# Patient Record
Sex: FEMALE | Race: WHITE | NOT HISPANIC OR LATINO | Employment: FULL TIME | URBAN - NONMETROPOLITAN AREA
[De-identification: names, ages, dates, MRNs, and addresses within clinical notes are randomized per-mention and may not be internally consistent; named-entity substitution may affect disease eponyms.]

---

## 2017-01-18 ENCOUNTER — OFFICE VISIT (OUTPATIENT)
Dept: FAMILY MEDICINE CLINIC | Facility: CLINIC | Age: 47
End: 2017-01-18

## 2017-01-18 VITALS
WEIGHT: 236 LBS | BODY MASS INDEX: 34.96 KG/M2 | HEIGHT: 69 IN | OXYGEN SATURATION: 100 % | HEART RATE: 75 BPM | SYSTOLIC BLOOD PRESSURE: 127 MMHG | DIASTOLIC BLOOD PRESSURE: 58 MMHG | TEMPERATURE: 98.2 F

## 2017-01-18 DIAGNOSIS — M54.50 BILATERAL LOW BACK PAIN WITHOUT SCIATICA, UNSPECIFIED CHRONICITY: ICD-10-CM

## 2017-01-18 DIAGNOSIS — E66.9 OBESITY, UNSPECIFIED OBESITY SEVERITY, UNSPECIFIED OBESITY TYPE: ICD-10-CM

## 2017-01-18 DIAGNOSIS — M51.36 DEGENERATION OF INTERVERTEBRAL DISC OF LUMBAR REGION: ICD-10-CM

## 2017-01-18 PROCEDURE — 99213 OFFICE O/P EST LOW 20 MIN: CPT | Performed by: FAMILY MEDICINE

## 2017-01-18 RX ORDER — HYDROCODONE BITARTRATE AND ACETAMINOPHEN 10; 325 MG/1; MG/1
1 TABLET ORAL EVERY 8 HOURS PRN
Qty: 90 TABLET | Refills: 0 | Status: SHIPPED | OUTPATIENT
Start: 2017-01-18 | End: 2017-02-15 | Stop reason: SDUPTHER

## 2017-01-18 RX ORDER — PHENTERMINE HYDROCHLORIDE 37.5 MG/1
37.5 TABLET ORAL
Qty: 30 TABLET | Refills: 0 | Status: SHIPPED | OUTPATIENT
Start: 2017-01-18 | End: 2017-02-15 | Stop reason: SDUPTHER

## 2017-01-18 NOTE — PROGRESS NOTES
Subjective   Jessy Uribe is a 46 y.o. female.     Chief Complaint   Patient presents with   • Follow-up     med refills       History of Present Illness   Pt here for sched f/u appt; denies new injuries; no saddle anesthesia; no bowel/bladder incontinence; no new rashes; no CP/SOA/palp; no syncope or vertigo.  The following portions of the patient's history were reviewed and updated as appropriate: allergies, current medications, past family history, past medical history, past social history, past surgical history and problem list.    Review of Systems   Constitutional: Negative for activity change, appetite change, chills, diaphoresis, fatigue, fever and unexpected weight change.   HENT: Negative for congestion, dental problem, drooling, ear discharge, ear pain, facial swelling, hearing loss, mouth sores, nosebleeds, postnasal drip, rhinorrhea, sinus pressure, sneezing, sore throat, tinnitus, trouble swallowing and voice change.    Eyes: Negative for photophobia, pain, discharge, redness, itching and visual disturbance.   Respiratory: Negative for apnea, cough, choking, chest tightness, shortness of breath, wheezing and stridor.    Cardiovascular: Negative for chest pain, palpitations and leg swelling.   Gastrointestinal: Negative for abdominal distention, abdominal pain, anal bleeding, blood in stool, constipation, diarrhea, nausea, rectal pain and vomiting.   Endocrine: Negative for cold intolerance, heat intolerance, polydipsia, polyphagia and polyuria.   Genitourinary: Negative for decreased urine volume, difficulty urinating, dysuria, enuresis, flank pain, frequency, genital sores, hematuria and urgency.   Musculoskeletal: Positive for arthralgias, back pain and myalgias. Negative for gait problem, joint swelling, neck pain and neck stiffness.   Skin: Negative for color change, pallor, rash and wound.   Allergic/Immunologic: Negative for food allergies and immunocompromised state.   Neurological:  "Positive for numbness. Negative for dizziness, tremors, seizures, syncope, facial asymmetry, speech difficulty, weakness, light-headedness and headaches.   Hematological: Negative for adenopathy. Does not bruise/bleed easily.   Psychiatric/Behavioral: Negative for agitation, behavioral problems, confusion, decreased concentration, dysphoric mood, hallucinations, self-injury, sleep disturbance and suicidal ideas. The patient is not nervous/anxious and is not hyperactive.        Patient Active Problem List   Diagnosis   • Iron deficiency anemia   • Low back pain   • Pain of right lower extremity   • Paresthesia of lower extremity   • Tobacco dependence syndrome   • Degeneration of intervertebral disc of lumbar region   • Obesity   • Hot flashes   • Current smoker       Current Outpatient Prescriptions on File Prior to Visit   Medication Sig Dispense Refill   • [DISCONTINUED] HYDROcodone-acetaminophen (NORCO)  MG per tablet Take 1 tablet by mouth Every 8 (Eight) Hours As Needed for moderate pain (4-6). 90 tablet 0   • [DISCONTINUED] phentermine (ADIPEX-P) 37.5 MG tablet Take 1 tablet by mouth Every Morning Before Breakfast. 30 tablet 0     No current facility-administered medications on file prior to visit.        Social History     Social History   • Marital status: Single     Spouse name: N/A   • Number of children: N/A   • Years of education: N/A     Occupational History   • Not on file.     Social History Main Topics   • Smoking status: Current Every Day Smoker   • Smokeless tobacco: Not on file   • Alcohol use No      Comment: unknown   • Drug use: No   • Sexual activity: Defer     Other Topics Concern   • Not on file     Social History Narrative       Objective   Blood pressure 127/58, pulse 75, temperature 98.2 °F (36.8 °C), height 69\" (175.3 cm), weight 236 lb (107 kg), SpO2 100 %.     Physical Exam Constitutional   General appearance: No acute distress, well appearing and well nourished.    Head and Face "   Head and face: Normal.    Palpation of the face and sinuses: No sinus tenderness.    Eyes   Conjunctiva and lids: No swelling, erythema or discharge.    Ears, Nose, Mouth, and Throat   External inspection of ears and nose: Normal.    Otoscopic examination: Tympanic membranes translucent with normal light reflex. Canals patent without erythema.    Hearing: Normal.    Nasal mucosa, septum, and turbinates: Normal without edema or erythema.    Lips, teeth, and gums: Normal, good dentition.    Oropharynx: Normal with no erythema, edema, exudate or lesions.    Neck   Neck: Supple, symmetric, trachea midline, no masses.    Thyroid: Normal, no thyromegaly.    Pulmonary   Respiratory effort: No increased work of breathing or signs of respiratory distress.    Auscultation of lungs: Abnormal.  Trace referred upper airway congestion.    Cardiovascular   Auscultation of heart: Normal rate and rhythm, normal S1 and S2, no murmurs.    Carotid pulses: 2+ bilaterally.    Examination of extremities for edema and/or varicosities: Abnormal.  Trace, non-pitting edema to rachael LE.    Chest Pt defers.    Chest: Normal.    Abdomen   Abdomen: Non-tender, no masses. Post-surgical scarring noted from recent laparoscopic procedure; CDI.    Liver and spleen: No hepatomegaly or splenomegaly.    Genitourinary Pt defers.    Lymphatic   Palpation of lymph nodes in neck: No lymphadenopathy.    Musculoskeletal   Gait and station: Normal.    Digits and nails: Normal without clubbing or cyanosis.    Joints, bones, and muscles: Abnormal.  Palpable abnml to anterior/medial border of distal tibial ridge; skin texture and visual appearance changed from surrounding tissue.    Range of motion: Abnormal.  Dec ROM A/P to L/S spine; mild tenderness on palpation of rachael SI joints; paraspinal musculature spastic in nature from T10-L5.    Stability: Normal.    Muscle strength/tone: Normal.    Skin   Skin and subcutaneous tissue: Normal without rashes or lesions.     Neurologic   Cranial nerves: Cranial nerves II-XII intact.    Cortical function: Normal mental status.    Reflexes: 2+ and symmetric.    Sensation: Abnormal.  Dec sensation to light touch/pressure to anterior distal RLE, medial border of tibial ridge.    Coordination: Normal finger to nose and heel to shin.    Psychiatric   Judgment and insight: Normal.    Orientation to person, place, and time: Normal.    Recent and remote memory: Intact.   Mood and affect: Normal    No results found for this or any previous visit.    Assessment/Plan   Problems Addressed this Visit        Digestive    Obesity    Relevant Medications    phentermine (ADIPEX-P) 37.5 MG tablet       Nervous and Auditory    Low back pain    Relevant Medications    HYDROcodone-acetaminophen (NORCO)  MG per tablet       Musculoskeletal and Integument    Degeneration of intervertebral disc of lumbar region    Relevant Medications    HYDROcodone-acetaminophen (NORCO)  MG per tablet      Other Visit Diagnoses     BMI 34.0-34.9,adult    -  Primary    Relevant Medications    phentermine (ADIPEX-P) 37.5 MG tablet               Discussion/Summary:  Discussed plan of care in detail with pt today; pt verb understanding and agrees; counseled for approx 10 min of total 15 min exam time.    Discussed need for reduction in sodium/salt/caffeine intake; improve sleep habits as able; inc formal CV exercise program with goal of vigorous activity most, if not all, days of the week; goal of 50 min of sustained HR CV exercise; to avoid prolonged fasting periods.    Maintain good hydration habits; asked that pt again continue to incorporate core strengthening/stabilizing programs such as yoga/pilates.    There are no Patient Instructions on file for this visit.

## 2017-02-15 ENCOUNTER — OFFICE VISIT (OUTPATIENT)
Dept: FAMILY MEDICINE CLINIC | Facility: CLINIC | Age: 47
End: 2017-02-15

## 2017-02-15 VITALS
OXYGEN SATURATION: 99 % | BODY MASS INDEX: 35.25 KG/M2 | WEIGHT: 238 LBS | HEART RATE: 66 BPM | SYSTOLIC BLOOD PRESSURE: 142 MMHG | HEIGHT: 69 IN | DIASTOLIC BLOOD PRESSURE: 59 MMHG | TEMPERATURE: 98.3 F

## 2017-02-15 DIAGNOSIS — E66.9 OBESITY, UNSPECIFIED OBESITY SEVERITY, UNSPECIFIED OBESITY TYPE: ICD-10-CM

## 2017-02-15 DIAGNOSIS — H65.22 CHRONIC SEROUS OTITIS MEDIA OF LEFT EAR: ICD-10-CM

## 2017-02-15 DIAGNOSIS — M54.50 BILATERAL LOW BACK PAIN WITHOUT SCIATICA, UNSPECIFIED CHRONICITY: ICD-10-CM

## 2017-02-15 DIAGNOSIS — M51.36 DEGENERATION OF INTERVERTEBRAL DISC OF LUMBAR REGION: ICD-10-CM

## 2017-02-15 PROCEDURE — 99213 OFFICE O/P EST LOW 20 MIN: CPT | Performed by: FAMILY MEDICINE

## 2017-02-15 RX ORDER — DEXAMETHASONE 0.5 MG/1
TABLET ORAL
Qty: 21 TABLET | Refills: 0 | Status: SHIPPED | OUTPATIENT
Start: 2017-02-15 | End: 2017-03-09

## 2017-02-15 RX ORDER — PHENTERMINE HYDROCHLORIDE 37.5 MG/1
37.5 TABLET ORAL
Qty: 30 TABLET | Refills: 0 | Status: SHIPPED | OUTPATIENT
Start: 2017-02-15 | End: 2017-03-09 | Stop reason: SDUPTHER

## 2017-02-15 RX ORDER — HYDROCODONE BITARTRATE AND ACETAMINOPHEN 10; 325 MG/1; MG/1
1 TABLET ORAL EVERY 8 HOURS PRN
Qty: 90 TABLET | Refills: 0 | Status: SHIPPED | OUTPATIENT
Start: 2017-02-15 | End: 2017-03-09 | Stop reason: SDUPTHER

## 2017-02-15 NOTE — PROGRESS NOTES
Subjective   Jessy Uribe is a 46 y.o. female.     Chief Complaint   Patient presents with   • Follow-up     med refills       History of Present Illness   Pt here for sched f/u appt; denies problems with current med regimen; no F/C; no palp/CP/SOA.  The following portions of the patient's history were reviewed and updated as appropriate: allergies, current medications, past family history, past medical history, past social history, past surgical history and problem list.    Review of Systems   Constitutional: Negative for activity change, appetite change, chills, diaphoresis, fatigue, fever and unexpected weight change.   HENT: Negative for congestion, dental problem, drooling, ear discharge, ear pain, facial swelling, hearing loss, mouth sores, nosebleeds, postnasal drip, rhinorrhea, sinus pressure, sneezing, sore throat, tinnitus, trouble swallowing and voice change.    Eyes: Negative for photophobia, pain, discharge, redness, itching and visual disturbance.   Respiratory: Negative for apnea, cough, choking, chest tightness, shortness of breath, wheezing and stridor.    Cardiovascular: Negative for chest pain, palpitations and leg swelling.   Gastrointestinal: Negative for abdominal distention, abdominal pain, anal bleeding, blood in stool, constipation, diarrhea, nausea, rectal pain and vomiting.   Endocrine: Negative for cold intolerance, heat intolerance, polydipsia, polyphagia and polyuria.   Genitourinary: Negative for decreased urine volume, difficulty urinating, dysuria, enuresis, flank pain, frequency, genital sores, hematuria and urgency.   Musculoskeletal: Positive for arthralgias, back pain and myalgias. Negative for gait problem, joint swelling, neck pain and neck stiffness.   Skin: Negative for color change, pallor, rash and wound.   Allergic/Immunologic: Negative for food allergies and immunocompromised state.   Neurological: Positive for numbness. Negative for dizziness, tremors, seizures,  "syncope, facial asymmetry, speech difficulty, weakness, light-headedness and headaches.   Hematological: Negative for adenopathy. Does not bruise/bleed easily.   Psychiatric/Behavioral: Negative for agitation, behavioral problems, confusion, decreased concentration, dysphoric mood, hallucinations, self-injury, sleep disturbance and suicidal ideas. The patient is not nervous/anxious and is not hyperactive.        Patient Active Problem List   Diagnosis   • Iron deficiency anemia   • Low back pain   • Pain of right lower extremity   • Paresthesia of lower extremity   • Tobacco dependence syndrome   • Degeneration of intervertebral disc of lumbar region   • Obesity   • Hot flashes   • Current smoker       Current Outpatient Prescriptions on File Prior to Visit   Medication Sig Dispense Refill   • [DISCONTINUED] HYDROcodone-acetaminophen (NORCO)  MG per tablet Take 1 tablet by mouth Every 8 (Eight) Hours As Needed for moderate pain (4-6). 90 tablet 0   • [DISCONTINUED] phentermine (ADIPEX-P) 37.5 MG tablet Take 1 tablet by mouth Every Morning Before Breakfast. 30 tablet 0     No current facility-administered medications on file prior to visit.        Social History     Social History   • Marital status: Single     Spouse name: N/A   • Number of children: N/A   • Years of education: N/A     Occupational History   • Not on file.     Social History Main Topics   • Smoking status: Current Every Day Smoker   • Smokeless tobacco: Not on file   • Alcohol use No      Comment: unknown   • Drug use: No   • Sexual activity: Defer     Other Topics Concern   • Not on file     Social History Narrative       Objective   Blood pressure 142/59, pulse 66, temperature 98.3 °F (36.8 °C), height 69\" (175.3 cm), weight 238 lb (108 kg), SpO2 99 %.     Physical Exam Constitutional   General appearance: No acute distress, well appearing and well nourished.    Head and Face   Head and face: Normal.    Palpation of the face and sinuses: No " sinus tenderness.    Eyes   Conjunctiva and lids: No swelling, erythema or discharge.    Ears, Nose, Mouth, and Throat   External inspection of ears and nose: Normal.    Otoscopic examination: Tympanic membranes translucent with normal light reflex. Canals patent without erythema.    Hearing: Normal.    Nasal mucosa, septum, and turbinates: Normal without edema or erythema.    Lips, teeth, and gums: Normal, good dentition.    Oropharynx: Normal with no erythema, edema, exudate or lesions.    Neck   Neck: Supple, symmetric, trachea midline, no masses.    Thyroid: Normal, no thyromegaly.    Pulmonary   Respiratory effort: No increased work of breathing or signs of respiratory distress.    Auscultation of lungs: Abnormal.  Trace referred upper airway congestion.    Cardiovascular   Auscultation of heart: Normal rate and rhythm, normal S1 and S2, no murmurs.    Carotid pulses: 2+ bilaterally.    Examination of extremities for edema and/or varicosities: Abnormal.  Trace, non-pitting edema to rachael LE.    Chest Pt defers.    Chest: Normal.    Abdomen   Abdomen: Non-tender, no masses. Post-surgical scarring noted from recent laparoscopic procedure; CDI.    Liver and spleen: No hepatomegaly or splenomegaly.    Genitourinary Pt defers.    Lymphatic   Palpation of lymph nodes in neck: No lymphadenopathy.    Musculoskeletal   Gait and station: Normal.    Digits and nails: Normal without clubbing or cyanosis.    Joints, bones, and muscles: Abnormal.  Palpable abnml to anterior/medial border of distal tibial ridge; skin texture and visual appearance changed from surrounding tissue.    Range of motion: Abnormal.  Dec ROM A/P to L/S spine; mild tenderness on palpation of rachael SI joints; paraspinal musculature spastic in nature from T10-L5.    Stability: Normal.    Muscle strength/tone: Normal.    Skin   Skin and subcutaneous tissue: Normal without rashes or lesions.    Neurologic   Cranial nerves: Cranial nerves II-XII intact.     Cortical function: Normal mental status.    Reflexes: 2+ and symmetric.    Sensation: Abnormal.  Dec sensation to light touch/pressure to anterior distal RLE, medial border of tibial ridge.    Coordination: Normal finger to nose and heel to shin.    Psychiatric   Judgment and insight: Normal.    Orientation to person, place, and time: Normal.    Recent and remote memory: Intact.   Mood and affect: Normal    No results found for this or any previous visit.    Assessment/Plan   Problems Addressed this Visit        Digestive    Obesity    Relevant Medications    phentermine (ADIPEX-P) 37.5 MG tablet       Nervous and Auditory    Low back pain    Relevant Medications    HYDROcodone-acetaminophen (NORCO)  MG per tablet       Musculoskeletal and Integument    Degeneration of intervertebral disc of lumbar region    Relevant Medications    HYDROcodone-acetaminophen (NORCO)  MG per tablet      Other Visit Diagnoses     BMI 35.0-35.9,adult    -  Primary    Chronic serous otitis media of left ear        Relevant Medications    dexamethasone (DECADRON) 0.5 MG tablet               Discussion/Summary:  Discussed plan of care in detail with pt today; pt verb understanding and agrees; counseled for approx 15 min of total 25 min exam time.    Discussed need for reduction in sodium/salt/caffeine intake; improve sleep habits as able; inc formal CV exercise program with goal of vigorous activity most, if not all, days of the week; goal of 50 min of sustained HR CV exercise.    There are no Patient Instructions on file for this visit.

## 2017-03-09 ENCOUNTER — OFFICE VISIT (OUTPATIENT)
Dept: FAMILY MEDICINE CLINIC | Facility: CLINIC | Age: 47
End: 2017-03-09

## 2017-03-09 VITALS
BODY MASS INDEX: 35.4 KG/M2 | DIASTOLIC BLOOD PRESSURE: 60 MMHG | HEART RATE: 73 BPM | HEIGHT: 69 IN | OXYGEN SATURATION: 100 % | TEMPERATURE: 98.5 F | WEIGHT: 239 LBS | SYSTOLIC BLOOD PRESSURE: 155 MMHG

## 2017-03-09 DIAGNOSIS — M54.50 BILATERAL LOW BACK PAIN WITHOUT SCIATICA, UNSPECIFIED CHRONICITY: ICD-10-CM

## 2017-03-09 DIAGNOSIS — E66.9 OBESITY, UNSPECIFIED OBESITY SEVERITY, UNSPECIFIED OBESITY TYPE: ICD-10-CM

## 2017-03-09 DIAGNOSIS — M51.36 DEGENERATION OF INTERVERTEBRAL DISC OF LUMBAR REGION: Primary | ICD-10-CM

## 2017-03-09 PROCEDURE — 99213 OFFICE O/P EST LOW 20 MIN: CPT | Performed by: FAMILY MEDICINE

## 2017-03-09 RX ORDER — PHENTERMINE HYDROCHLORIDE 37.5 MG/1
37.5 TABLET ORAL
Qty: 30 TABLET | Refills: 0 | Status: SHIPPED | OUTPATIENT
Start: 2017-03-09 | End: 2017-05-17

## 2017-03-09 RX ORDER — HYDROCODONE BITARTRATE AND ACETAMINOPHEN 10; 325 MG/1; MG/1
1 TABLET ORAL EVERY 8 HOURS PRN
Qty: 90 TABLET | Refills: 0 | Status: SHIPPED | OUTPATIENT
Start: 2017-03-09 | End: 2017-04-18 | Stop reason: SDUPTHER

## 2017-03-09 NOTE — PROGRESS NOTES
Subjective   Jessy Uribe is a 46 y.o. female.     Chief Complaint   Patient presents with   • Follow-up     med refills   • Earache     Bilateral ear pain       History of Present Illness   Pt here for sched f/u appt; no problems with current meds; no saddle anesthesia or bowel/bladder incontinence; continues to have popping sensation and fullness to rachael ears; no drainage.  The following portions of the patient's history were reviewed and updated as appropriate: allergies, current medications, past family history, past medical history, past social history, past surgical history and problem list.    Review of Systems   Constitutional: Negative for activity change, appetite change, chills, diaphoresis, fatigue, fever and unexpected weight change.   HENT: Positive for congestion and ear pain. Negative for dental problem, drooling, ear discharge, facial swelling, hearing loss, mouth sores, nosebleeds, postnasal drip, rhinorrhea, sinus pressure, sneezing, sore throat, tinnitus, trouble swallowing and voice change.    Eyes: Negative for photophobia, pain, discharge, redness, itching and visual disturbance.   Respiratory: Negative for apnea, cough, choking, chest tightness, shortness of breath, wheezing and stridor.    Cardiovascular: Negative for chest pain, palpitations and leg swelling.   Gastrointestinal: Negative for abdominal distention, abdominal pain, anal bleeding, blood in stool, constipation, diarrhea, nausea, rectal pain and vomiting.   Endocrine: Negative for cold intolerance, heat intolerance, polydipsia, polyphagia and polyuria.   Genitourinary: Negative for decreased urine volume, difficulty urinating, dysuria, enuresis, flank pain, frequency, genital sores, hematuria and urgency.   Musculoskeletal: Positive for arthralgias, back pain and myalgias. Negative for gait problem, joint swelling, neck pain and neck stiffness.   Skin: Negative for color change, pallor, rash and wound.   Allergic/Immunologic:  Negative for food allergies and immunocompromised state.   Neurological: Positive for numbness. Negative for dizziness, tremors, seizures, syncope, facial asymmetry, speech difficulty, weakness, light-headedness and headaches.   Hematological: Negative for adenopathy. Does not bruise/bleed easily.   Psychiatric/Behavioral: Negative for agitation, behavioral problems, confusion, decreased concentration, dysphoric mood, hallucinations, self-injury, sleep disturbance and suicidal ideas. The patient is not nervous/anxious and is not hyperactive.        Patient Active Problem List   Diagnosis   • Iron deficiency anemia   • Low back pain   • Pain of right lower extremity   • Paresthesia of lower extremity   • Tobacco dependence syndrome   • Degeneration of intervertebral disc of lumbar region   • Obesity   • Hot flashes   • Current smoker       Current Outpatient Prescriptions on File Prior to Visit   Medication Sig Dispense Refill   • [DISCONTINUED] HYDROcodone-acetaminophen (NORCO)  MG per tablet Take 1 tablet by mouth Every 8 (Eight) Hours As Needed for moderate pain (4-6). 90 tablet 0   • [DISCONTINUED] phentermine (ADIPEX-P) 37.5 MG tablet Take 1 tablet by mouth Every Morning Before Breakfast. 30 tablet 0   • [DISCONTINUED] dexamethasone (DECADRON) 0.5 MG tablet 6 po x1d; then 5 po x 1d; then 4 po x 1d; then 3 po x 1d; then 2 po x 1d; then 1 po x1d; then STOP 21 tablet 0     No current facility-administered medications on file prior to visit.        Social History     Social History   • Marital status: Single     Spouse name: N/A   • Number of children: N/A   • Years of education: N/A     Occupational History   • Not on file.     Social History Main Topics   • Smoking status: Current Every Day Smoker   • Smokeless tobacco: Not on file   • Alcohol use No      Comment: unknown   • Drug use: No   • Sexual activity: Defer     Other Topics Concern   • Not on file     Social History Narrative       Objective   Blood  "pressure 155/60, pulse 73, temperature 98.5 °F (36.9 °C), height 69\" (175.3 cm), weight 239 lb (108 kg), SpO2 100 %.     Physical Exam Constitutional   General appearance: No acute distress, well appearing and well nourished.    Head and Face   Head and face: Normal.    Palpation of the face and sinuses: No sinus tenderness.    Eyes   Conjunctiva and lids: No swelling, erythema or discharge.    Ears, Nose, Mouth, and Throat   External inspection of ears and nose: Normal.    Otoscopic examination: Tympanic membranes translucent with normal light reflex. Canals patent without erythema.    Hearing: Normal.    Nasal mucosa, septum, and turbinates: Normal without edema or erythema.    Lips, teeth, and gums: Normal, good dentition.    Oropharynx: Normal with no erythema, edema, exudate or lesions.    Neck   Neck: Supple, symmetric, trachea midline, no masses.    Thyroid: Normal, no thyromegaly.    Pulmonary   Respiratory effort: No increased work of breathing or signs of respiratory distress.    Auscultation of lungs: Abnormal.  Trace referred upper airway congestion.    Cardiovascular   Auscultation of heart: Normal rate and rhythm, normal S1 and S2, no murmurs.    Carotid pulses: 2+ bilaterally.    Examination of extremities for edema and/or varicosities: Abnormal.  Trace, non-pitting edema to rachael LE.    Chest Pt defers.    Chest: Normal.    Abdomen   Abdomen: Non-tender, no masses. Post-surgical scarring noted from recent laparoscopic procedure; CDI.    Liver and spleen: No hepatomegaly or splenomegaly.    Genitourinary Pt defers.    Lymphatic   Palpation of lymph nodes in neck: No lymphadenopathy.    Musculoskeletal   Gait and station: Normal.    Digits and nails: Normal without clubbing or cyanosis.    Joints, bones, and muscles: Abnormal.  Palpable abnml to anterior/medial border of distal tibial ridge; skin texture and visual appearance changed from surrounding tissue.    Range of motion: Abnormal.  Dec ROM A/P to " L/S spine; mild tenderness on palpation of rachael SI joints; paraspinal musculature spastic in nature from T10-L5.    Stability: Normal.    Muscle strength/tone: Normal.    Skin   Skin and subcutaneous tissue: Normal without rashes or lesions.    Neurologic   Cranial nerves: Cranial nerves II-XII intact.    Cortical function: Normal mental status.    Reflexes: 2+ and symmetric.    Sensation: Abnormal.  Dec sensation to light touch/pressure to anterior distal RLE, medial border of tibial ridge.    Coordination: Normal finger to nose and heel to shin.    Psychiatric   Judgment and insight: Normal.    Orientation to person, place, and time: Normal.    Recent and remote memory: Intact.   Mood and affect: Normal    No results found for this or any previous visit.    Assessment/Plan   Problems Addressed this Visit        Digestive    Obesity    Relevant Medications    phentermine (ADIPEX-P) 37.5 MG tablet       Nervous and Auditory    Low back pain    Relevant Medications    HYDROcodone-acetaminophen (NORCO)  MG per tablet       Musculoskeletal and Integument    Degeneration of intervertebral disc of lumbar region - Primary    Relevant Medications    HYDROcodone-acetaminophen (NORCO)  MG per tablet      Other Visit Diagnoses     BMI 35.0-35.9,adult                   Discussion/Summary:Discussed plan of care in detail with pt today; pt verb understanding and agrees; counseled for approx 15 min of total 25 min exam time.    Continue saline nasal spray; cont valsalva maneuver also.  Adding cool mist humidifier to her bedroom also.  There are no Patient Instructions on file for this visit.

## 2017-04-18 ENCOUNTER — OFFICE VISIT (OUTPATIENT)
Dept: FAMILY MEDICINE CLINIC | Facility: CLINIC | Age: 47
End: 2017-04-18

## 2017-04-18 VITALS
HEIGHT: 69 IN | RESPIRATION RATE: 16 BRPM | BODY MASS INDEX: 34.36 KG/M2 | OXYGEN SATURATION: 100 % | TEMPERATURE: 98 F | HEART RATE: 60 BPM | DIASTOLIC BLOOD PRESSURE: 65 MMHG | WEIGHT: 232 LBS | SYSTOLIC BLOOD PRESSURE: 134 MMHG

## 2017-04-18 DIAGNOSIS — M54.50 BILATERAL LOW BACK PAIN WITHOUT SCIATICA, UNSPECIFIED CHRONICITY: ICD-10-CM

## 2017-04-18 DIAGNOSIS — E66.9 OBESITY, UNSPECIFIED OBESITY SEVERITY, UNSPECIFIED OBESITY TYPE: ICD-10-CM

## 2017-04-18 DIAGNOSIS — M51.36 DEGENERATION OF INTERVERTEBRAL DISC OF LUMBAR REGION: ICD-10-CM

## 2017-04-18 PROCEDURE — 99214 OFFICE O/P EST MOD 30 MIN: CPT | Performed by: INTERNAL MEDICINE

## 2017-04-18 RX ORDER — PHENTERMINE HYDROCHLORIDE 37.5 MG/1
37.5 TABLET ORAL
Qty: 30 TABLET | Refills: 0 | Status: CANCELLED | OUTPATIENT
Start: 2017-04-18

## 2017-04-18 RX ORDER — HYDROCODONE BITARTRATE AND ACETAMINOPHEN 10; 325 MG/1; MG/1
1 TABLET ORAL EVERY 8 HOURS PRN
Qty: 90 TABLET | Refills: 0 | Status: SHIPPED | OUTPATIENT
Start: 2017-04-18 | End: 2017-05-17 | Stop reason: SDUPTHER

## 2017-04-18 NOTE — PROGRESS NOTES
Chief Complaint   Patient presents with   • Follow-up     Patient states she is here to follow up for medication refills. Patient states she is having pain in both ears.        Subjective     History of Present Illness   Jessy Uribe is a 46 y.o. female presenting for pain medication refills.  Pt had been following with Dr. Gregorio for chronic pain.  Back pain history was reviewed:  Pain is a constant soreness with stabbing pains in the low mid back.  Low back surgery 1999 has had chronic pain since that time.  Dr. Banda in [facility] performed L5-S1 surgery.  Has had chronic low back pain with LLE radiation.    The following portions of the patient's history were reviewed and updated as appropriate: allergies, current medications, past family history, past medical history, past social history, past surgical history and problem list.    Review of Systems   Constitutional: Negative for chills, fatigue and fever.   HENT: Negative for congestion, ear pain, rhinorrhea and sore throat.    Respiratory: Negative for cough, shortness of breath and wheezing.    Cardiovascular: Negative for chest pain, palpitations and leg swelling.   Gastrointestinal: Negative for abdominal pain, blood in stool, constipation, diarrhea, nausea and vomiting.   Genitourinary: Negative for dysuria and hematuria.   Musculoskeletal: Positive for back pain.   Skin: Negative.    Neurological: Negative for dizziness, light-headedness and headaches.   Psychiatric/Behavioral: Negative for dysphoric mood and sleep disturbance. The patient is not nervous/anxious.        No Known Allergies    Past Medical History:   Diagnosis Date   • Acute bronchitis with bronchospasm    • BMI 35.0-35.9,adult    • BMI 36.0-36.9,adult    • BMI 37.0-37.9, adult    • Fracture of lower extremity    • Gallstones    • Obesity 6/24/2016       Social History     Social History   • Marital status: Single     Spouse name: N/A   • Number of children: N/A   • Years of  "education: N/A     Occupational History   • Not on file.     Social History Main Topics   • Smoking status: Current Every Day Smoker   • Smokeless tobacco: Not on file   • Alcohol use No      Comment: unknown   • Drug use: No   • Sexual activity: Defer     Other Topics Concern   • Not on file     Social History Narrative        Past Surgical History:   Procedure Laterality Date   • ANTERIOR COMPARTMENT DECOMPRESSION      Leg Decompression Fasciotomy Ant Compartment, Debridement   • BACK SURGERY     •  SECTION     • CHOLECYSTECTOMY     • LAPAROSCOPY REPAIR HIATAL HERNIA         Family History   Problem Relation Age of Onset   • Diabetes Mother    • Other Father      Cerebrovascular accident   • Cancer Sister      pancreatic         Current Outpatient Prescriptions:   •  HYDROcodone-acetaminophen (NORCO)  MG per tablet, Take 1 tablet by mouth Every 8 (Eight) Hours As Needed for Moderate Pain (4-6)., Disp: 90 tablet, Rfl: 0  •  phentermine (ADIPEX-P) 37.5 MG tablet, Take 1 tablet by mouth Every Morning Before Breakfast., Disp: 30 tablet, Rfl: 0    Objective   /65 (BP Location: Right arm, Patient Position: Sitting, Cuff Size: Adult)  Pulse 60  Temp 98 °F (36.7 °C) (Oral)   Resp 16  Ht 69\" (175.3 cm)  Wt 232 lb (105 kg)  SpO2 100%  BMI 34.26 kg/m2    Physical Exam   Constitutional: She is oriented to person, place, and time. She appears well-developed and well-nourished.   HENT:   Head: Normocephalic and atraumatic.   Eyes: Conjunctivae are normal.   Pulmonary/Chest: Effort normal.   Musculoskeletal: Normal range of motion.   Neurological: She is alert and oriented to person, place, and time.   Psychiatric: She has a normal mood and affect. Her behavior is normal.   Nursing note and vitals reviewed.      Assessment/Plan   Jessy was seen today for follow-up.    Diagnoses and all orders for this visit:    Bilateral low back pain without sciatica, unspecified chronicity  -     " HYDROcodone-acetaminophen (NORCO)  MG per tablet; Take 1 tablet by mouth Every 8 (Eight) Hours As Needed for Moderate Pain (4-6).    Degeneration of intervertebral disc of lumbar region  -     HYDROcodone-acetaminophen (NORCO)  MG per tablet; Take 1 tablet by mouth Every 8 (Eight) Hours As Needed for Moderate Pain (4-6).    Obesity, unspecified obesity severity, unspecified obesity type    Other orders  -     Cancel: phentermine (ADIPEX-P) 37.5 MG tablet; Take 1 tablet by mouth Every Morning Before Breakfast.        Discussion Summary:  46-year-old white female with chronic pain presenting for pain medication refills.    -pain medications were refilled.  The patient has read and signed the [facility] Controlled Substance Contract.  I will continue to see patient for regular follow up appointments.  They are well controlled on their medication.  ALTAGRACIA has been reviewed by me and is updated every 3 months. The patient is aware of the potential for addiction and dependence.  - UDS ordered.       I shared that I would not provide adipex.  She may consider obtaining this from an alternate provider.    Follow up:  Return in about 1 month (around 5/18/2017) for Next scheduled follow up, Med Refills.     Patient Instructions:  Patient instructions were provided.

## 2017-05-17 ENCOUNTER — OFFICE VISIT (OUTPATIENT)
Dept: FAMILY MEDICINE CLINIC | Facility: CLINIC | Age: 47
End: 2017-05-17

## 2017-05-17 VITALS
SYSTOLIC BLOOD PRESSURE: 127 MMHG | OXYGEN SATURATION: 99 % | TEMPERATURE: 97.9 F | BODY MASS INDEX: 34.96 KG/M2 | DIASTOLIC BLOOD PRESSURE: 75 MMHG | RESPIRATION RATE: 18 BRPM | WEIGHT: 236 LBS | HEART RATE: 65 BPM | HEIGHT: 69 IN

## 2017-05-17 DIAGNOSIS — M54.50 BILATERAL LOW BACK PAIN WITHOUT SCIATICA, UNSPECIFIED CHRONICITY: ICD-10-CM

## 2017-05-17 DIAGNOSIS — Z71.6 ENCOUNTER FOR SMOKING CESSATION COUNSELING: ICD-10-CM

## 2017-05-17 DIAGNOSIS — Z23 NEED FOR PNEUMOCOCCAL VACCINATION: ICD-10-CM

## 2017-05-17 DIAGNOSIS — M51.36 DEGENERATION OF INTERVERTEBRAL DISC OF LUMBAR REGION: ICD-10-CM

## 2017-05-17 DIAGNOSIS — Z00.00 HEALTH CARE MAINTENANCE: ICD-10-CM

## 2017-05-17 DIAGNOSIS — R53.83 OTHER FATIGUE: Primary | ICD-10-CM

## 2017-05-17 LAB
25(OH)D3+25(OH)D2 SERPL-MCNC: 37.1 NG/ML
ALBUMIN SERPL-MCNC: 3.7 G/DL (ref 3.5–5)
ALBUMIN/GLOB SERPL: 1.2 G/DL (ref 1–2)
ALP SERPL-CCNC: 92 U/L (ref 38–126)
ALT SERPL-CCNC: 30 U/L (ref 13–69)
AST SERPL-CCNC: 26 U/L (ref 15–46)
BASOPHILS # BLD AUTO: 0.03 10*3/MM3 (ref 0–0.2)
BASOPHILS NFR BLD AUTO: 0.4 % (ref 0–2.5)
BILIRUB SERPL-MCNC: 0.4 MG/DL (ref 0.2–1.3)
BUN SERPL-MCNC: 14 MG/DL (ref 7–20)
BUN/CREAT SERPL: 23.3 (ref 7.1–23.5)
CALCIUM SERPL-MCNC: 9.1 MG/DL (ref 8.4–10.2)
CHLORIDE SERPL-SCNC: 107 MMOL/L (ref 98–107)
CHOLEST SERPL-MCNC: 181 MG/DL (ref 0–199)
CO2 SERPL-SCNC: 27 MMOL/L (ref 26–30)
CREAT SERPL-MCNC: 0.6 MG/DL (ref 0.6–1.3)
DIFFERENTIAL COMMENT: NORMAL
EOSINOPHIL # BLD AUTO: 0.12 10*3/MM3 (ref 0–0.7)
EOSINOPHIL NFR BLD AUTO: 1.5 % (ref 0–7)
ERYTHROCYTE [DISTWIDTH] IN BLOOD BY AUTOMATED COUNT: 18.8 % (ref 11.5–14.5)
GLOBULIN SER CALC-MCNC: 3 GM/DL
GLUCOSE SERPL-MCNC: 82 MG/DL (ref 74–98)
HBA1C MFR BLD: 5.7 %
HCT VFR BLD AUTO: 32.7 % (ref 37–47)
HDLC SERPL-MCNC: 63 MG/DL (ref 40–60)
HGB BLD-MCNC: 9.1 G/DL (ref 12–16)
IMM GRANULOCYTES # BLD: 0.02 10*3/MM3 (ref 0–0.06)
IMM GRANULOCYTES NFR BLD: 0.2 % (ref 0–0.6)
LDLC SERPL CALC-MCNC: 101 MG/DL (ref 0–99)
LYMPHOCYTES # BLD AUTO: 1.61 10*3/MM3 (ref 0.6–3.4)
LYMPHOCYTES NFR BLD AUTO: 19.5 % (ref 10–50)
MCH RBC QN AUTO: 20.9 PG (ref 27–31)
MCHC RBC AUTO-ENTMCNC: 27.8 G/DL (ref 30–37)
MCV RBC AUTO: 75 FL (ref 81–99)
MONOCYTES # BLD AUTO: 0.33 10*3/MM3 (ref 0–0.9)
MONOCYTES NFR BLD AUTO: 4 % (ref 0–12)
NEUTROPHILS # BLD AUTO: 6.15 10*3/MM3 (ref 2–6.9)
NEUTROPHILS NFR BLD AUTO: 74.4 % (ref 37–80)
NRBC BLD AUTO-RTO: 0 /100 WBC (ref 0–0)
PLATELET # BLD AUTO: 151 10*3/MM3 (ref 130–400)
PLATELET BLD QL SMEAR: NORMAL
POTASSIUM SERPL-SCNC: 4.3 MMOL/L (ref 3.5–5.1)
PROT SERPL-MCNC: 6.7 G/DL (ref 6.3–8.2)
RBC # BLD AUTO: 4.36 10*6/MM3 (ref 4.2–5.4)
RBC MORPH BLD: NORMAL
SODIUM SERPL-SCNC: 141 MMOL/L (ref 137–145)
TRIGL SERPL-MCNC: 86 MG/DL
TSH SERPL DL<=0.005 MIU/L-ACNC: 1.05 MIU/ML (ref 0.47–4.68)
VIT B12 SERPL-MCNC: 300 PG/ML (ref 239–931)
VLDLC SERPL CALC-MCNC: 17.2 MG/DL
WBC # BLD AUTO: 8.26 10*3/MM3 (ref 4.8–10.8)

## 2017-05-17 PROCEDURE — 99406 BEHAV CHNG SMOKING 3-10 MIN: CPT | Performed by: INTERNAL MEDICINE

## 2017-05-17 PROCEDURE — 99214 OFFICE O/P EST MOD 30 MIN: CPT | Performed by: INTERNAL MEDICINE

## 2017-05-17 RX ORDER — BUPROPION HYDROCHLORIDE 150 MG/1
150 TABLET ORAL DAILY
Qty: 30 TABLET | Refills: 2 | Status: SHIPPED | OUTPATIENT
Start: 2017-05-17 | End: 2017-07-14 | Stop reason: SDUPTHER

## 2017-05-17 RX ORDER — HYDROCODONE BITARTRATE AND ACETAMINOPHEN 10; 325 MG/1; MG/1
1 TABLET ORAL EVERY 8 HOURS PRN
Qty: 90 TABLET | Refills: 0 | Status: SHIPPED | OUTPATIENT
Start: 2017-05-17 | End: 2017-06-14 | Stop reason: SDUPTHER

## 2017-05-18 DIAGNOSIS — D50.9 MICROCYTIC ANEMIA: Primary | ICD-10-CM

## 2017-06-14 ENCOUNTER — OFFICE VISIT (OUTPATIENT)
Dept: FAMILY MEDICINE CLINIC | Facility: CLINIC | Age: 47
End: 2017-06-14

## 2017-06-14 VITALS
OXYGEN SATURATION: 97 % | HEART RATE: 73 BPM | BODY MASS INDEX: 35.7 KG/M2 | SYSTOLIC BLOOD PRESSURE: 124 MMHG | RESPIRATION RATE: 18 BRPM | HEIGHT: 69 IN | TEMPERATURE: 98.4 F | WEIGHT: 241 LBS | DIASTOLIC BLOOD PRESSURE: 72 MMHG

## 2017-06-14 DIAGNOSIS — M51.36 DEGENERATION OF INTERVERTEBRAL DISC OF LUMBAR REGION: ICD-10-CM

## 2017-06-14 DIAGNOSIS — M54.50 BILATERAL LOW BACK PAIN WITHOUT SCIATICA, UNSPECIFIED CHRONICITY: ICD-10-CM

## 2017-06-14 PROCEDURE — 99214 OFFICE O/P EST MOD 30 MIN: CPT | Performed by: INTERNAL MEDICINE

## 2017-06-14 RX ORDER — HYDROCODONE BITARTRATE AND ACETAMINOPHEN 10; 325 MG/1; MG/1
1 TABLET ORAL EVERY 8 HOURS PRN
Qty: 90 TABLET | Refills: 0 | Status: SHIPPED | OUTPATIENT
Start: 2017-06-14 | End: 2017-07-14 | Stop reason: SDUPTHER

## 2017-06-14 NOTE — PROGRESS NOTES
Chief Complaint   Patient presents with   • Med Refill       Subjective     History of Present Illness   Jessy Uribe is a 46 y.o. female presenting for follow-up of chronic pain.  Pain remains stable at this time.  She has no new complaints.        The following portions of the patient's history were reviewed and updated as appropriate: allergies, current medications, past family history, past medical history, past social history, past surgical history and problem list.    Review of Systems   Constitutional: Negative for chills, fatigue and fever.   HENT: Negative for congestion, ear pain, rhinorrhea, sinus pressure and sore throat.    Eyes: Negative for visual disturbance.   Respiratory: Negative for cough, chest tightness, shortness of breath and wheezing.    Cardiovascular: Negative for chest pain, palpitations and leg swelling.   Gastrointestinal: Negative for abdominal pain, blood in stool, constipation, diarrhea, nausea and vomiting.   Endocrine: Negative for polydipsia and polyuria.   Genitourinary: Negative for dysuria and hematuria.   Musculoskeletal: Negative for back pain.   Skin: Negative for rash.   Neurological: Negative for dizziness, light-headedness, numbness and headaches.   Psychiatric/Behavioral: Negative for dysphoric mood and sleep disturbance. The patient is not nervous/anxious.        No Known Allergies    Past Medical History:   Diagnosis Date   • Acute bronchitis with bronchospasm    • BMI 35.0-35.9,adult    • BMI 36.0-36.9,adult    • BMI 37.0-37.9, adult    • Fracture of lower extremity    • Gallstones    • Obesity 6/24/2016       Social History     Social History   • Marital status: Single     Spouse name: N/A   • Number of children: N/A   • Years of education: N/A     Occupational History   • Not on file.     Social History Main Topics   • Smoking status: Current Every Day Smoker   • Smokeless tobacco: Not on file   • Alcohol use No      Comment: unknown   • Drug use: No   • Sexual  "activity: Defer     Other Topics Concern   • Not on file     Social History Narrative        Past Surgical History:   Procedure Laterality Date   • ANTERIOR COMPARTMENT DECOMPRESSION      Leg Decompression Fasciotomy Ant Compartment, Debridement   • BACK SURGERY     •  SECTION     • CHOLECYSTECTOMY     • LAPAROSCOPY REPAIR HIATAL HERNIA         Family History   Problem Relation Age of Onset   • Diabetes Mother    • Other Father      Cerebrovascular accident   • Cancer Sister      pancreatic         Current Outpatient Prescriptions:   •  buPROPion XL (WELLBUTRIN XL) 150 MG 24 hr tablet, Take 1 tablet by mouth Daily., Disp: 30 tablet, Rfl: 2  •  HYDROcodone-acetaminophen (NORCO)  MG per tablet, Take 1 tablet by mouth Every 8 (Eight) Hours As Needed for Moderate Pain (4-6)., Disp: 90 tablet, Rfl: 0    Objective   /72 (BP Location: Right arm, Patient Position: Sitting, Cuff Size: Adult)  Pulse 73  Temp 98.4 °F (36.9 °C) (Oral)   Resp 18  Ht 69\" (175.3 cm)  Wt 241 lb (109 kg)  SpO2 97%  BMI 35.59 kg/m2    Physical Exam   Constitutional: She is oriented to person, place, and time. She appears well-nourished. No distress.   HENT:   Head: Atraumatic.   Right Ear: External ear normal.   Left Ear: External ear normal.   Eyes: Conjunctivae are normal. Right eye exhibits no discharge. Left eye exhibits no discharge.   Neck: Normal range of motion. Neck supple.   Cardiovascular: Normal rate and regular rhythm.    No murmur heard.  Pulmonary/Chest: Effort normal and breath sounds normal. She has no wheezes. She has no rales.   Abdominal: Soft. Bowel sounds are normal. She exhibits no distension. There is no tenderness.   Neurological: She is alert and oriented to person, place, and time.   Skin: No rash noted. She is not diaphoretic.   Psychiatric: She has a normal mood and affect.   Nursing note and vitals reviewed.      Assessment/Plan   Jessy was seen today for med refill.    Diagnoses and all " orders for this visit:    Bilateral low back pain without sciatica, unspecified chronicity  -     HYDROcodone-acetaminophen (NORCO)  MG per tablet; Take 1 tablet by mouth Every 8 (Eight) Hours As Needed for Moderate Pain (4-6).    Degeneration of intervertebral disc of lumbar region  -     HYDROcodone-acetaminophen (NORCO)  MG per tablet; Take 1 tablet by mouth Every 8 (Eight) Hours As Needed for Moderate Pain (4-6).        Discussion Summary:    46-year-old white female presenting for follow-up on chronic pain.  Pain medications were refilled.  Altagracia was reviewed.  Former UDS was reviewed and is appropriate.  - Patient is aware he/she is being prescribed a high risk controlled substance. A ALTAGRACIA was reviewed and appropriate prior to providing prescriptions for controlled substances.        Follow up:  No Follow-up on file.

## 2017-07-14 ENCOUNTER — OFFICE VISIT (OUTPATIENT)
Dept: FAMILY MEDICINE CLINIC | Facility: CLINIC | Age: 47
End: 2017-07-14

## 2017-07-14 VITALS
HEART RATE: 71 BPM | SYSTOLIC BLOOD PRESSURE: 115 MMHG | BODY MASS INDEX: 36.95 KG/M2 | DIASTOLIC BLOOD PRESSURE: 65 MMHG | TEMPERATURE: 97.9 F | HEIGHT: 68 IN | OXYGEN SATURATION: 97 % | WEIGHT: 243.8 LBS

## 2017-07-14 DIAGNOSIS — Z71.6 ENCOUNTER FOR SMOKING CESSATION COUNSELING: ICD-10-CM

## 2017-07-14 DIAGNOSIS — M54.50 BILATERAL LOW BACK PAIN WITHOUT SCIATICA, UNSPECIFIED CHRONICITY: ICD-10-CM

## 2017-07-14 DIAGNOSIS — B96.89 BACTERIAL SINUSITIS: ICD-10-CM

## 2017-07-14 DIAGNOSIS — B37.31 YEAST VAGINITIS: Primary | ICD-10-CM

## 2017-07-14 DIAGNOSIS — M51.36 DEGENERATION OF INTERVERTEBRAL DISC OF LUMBAR REGION: ICD-10-CM

## 2017-07-14 DIAGNOSIS — J32.9 BACTERIAL SINUSITIS: ICD-10-CM

## 2017-07-14 PROCEDURE — 99214 OFFICE O/P EST MOD 30 MIN: CPT | Performed by: INTERNAL MEDICINE

## 2017-07-14 RX ORDER — AMOXICILLIN AND CLAVULANATE POTASSIUM 875; 125 MG/1; MG/1
1 TABLET, FILM COATED ORAL 2 TIMES DAILY
Qty: 14 TABLET | Refills: 0 | Status: SHIPPED | OUTPATIENT
Start: 2017-07-14 | End: 2017-08-18

## 2017-07-14 RX ORDER — FLUCONAZOLE 150 MG/1
150 TABLET ORAL ONCE
Qty: 1 TABLET | Refills: 0 | Status: SHIPPED | OUTPATIENT
Start: 2017-07-14 | End: 2017-07-14

## 2017-07-14 RX ORDER — BUPROPION HYDROCHLORIDE 150 MG/1
150 TABLET ORAL DAILY
Qty: 30 TABLET | Refills: 2 | Status: SHIPPED | OUTPATIENT
Start: 2017-07-14 | End: 2017-08-18

## 2017-07-14 RX ORDER — HYDROCODONE BITARTRATE AND ACETAMINOPHEN 10; 325 MG/1; MG/1
1 TABLET ORAL EVERY 8 HOURS PRN
Qty: 90 TABLET | Refills: 0 | Status: SHIPPED | OUTPATIENT
Start: 2017-07-14 | End: 2017-08-18 | Stop reason: SDUPTHER

## 2017-07-14 RX ORDER — HYDROCODONE BITARTRATE AND ACETAMINOPHEN 5; 325 MG/1; MG/1
5 TABLET ORAL EVERY 6 HOURS PRN
COMMUNITY
Start: 2015-12-12 | End: 2017-07-14

## 2017-07-17 NOTE — PROGRESS NOTES
Chief Complaint   Patient presents with   • Follow-up     patient is here for medicine refill       Subjective     History of Present Illness   Jessy Uribe is a 47 y.o. female with history of chronic pain who presents for medication refills.  He should also mentions that she has been having problems with her sinuses with persistent is a congestion, cough, and frontal and maxillary headaches for the last 2 weeks.      The following portions of the patient's history were reviewed and updated as appropriate: allergies, current medications, past family history, past medical history, past social history, past surgical history and problem list.    Review of Systems   Constitutional: Negative for chills, fatigue and fever.   HENT: Negative for congestion, ear pain, rhinorrhea, sinus pressure and sore throat.    Eyes: Negative for visual disturbance.   Respiratory: Negative for cough, chest tightness, shortness of breath and wheezing.    Cardiovascular: Negative for chest pain, palpitations and leg swelling.   Gastrointestinal: Negative for abdominal pain, blood in stool, constipation, diarrhea, nausea and vomiting.   Endocrine: Negative for polydipsia and polyuria.   Genitourinary: Negative for dysuria and hematuria.   Musculoskeletal: Negative for back pain.   Skin: Negative for rash.   Neurological: Negative for dizziness, light-headedness, numbness and headaches.   Psychiatric/Behavioral: Negative for dysphoric mood and sleep disturbance. The patient is not nervous/anxious.        No Known Allergies    Past Medical History:   Diagnosis Date   • Acute bronchitis with bronchospasm    • BMI 35.0-35.9,adult    • BMI 36.0-36.9,adult    • BMI 37.0-37.9, adult    • Fracture of lower extremity    • Gallstones    • Obesity 6/24/2016       Social History     Social History   • Marital status: Single     Spouse name: N/A   • Number of children: N/A   • Years of education: N/A     Occupational History   • Not on file.  "    Social History Main Topics   • Smoking status: Current Every Day Smoker   • Smokeless tobacco: Not on file   • Alcohol use No      Comment: unknown   • Drug use: No   • Sexual activity: Defer     Other Topics Concern   • Not on file     Social History Narrative        Past Surgical History:   Procedure Laterality Date   • ANTERIOR COMPARTMENT DECOMPRESSION      Leg Decompression Fasciotomy Ant Compartment, Debridement   • BACK SURGERY     •  SECTION     • CHOLECYSTECTOMY     • LAPAROSCOPY REPAIR HIATAL HERNIA         Family History   Problem Relation Age of Onset   • Diabetes Mother    • Other Father      Cerebrovascular accident   • Cancer Sister      pancreatic         Current Outpatient Prescriptions:   •  buPROPion XL (WELLBUTRIN XL) 150 MG 24 hr tablet, Take 1 tablet by mouth Daily., Disp: 30 tablet, Rfl: 2  •  HYDROcodone-acetaminophen (NORCO)  MG per tablet, Take 1 tablet by mouth Every 8 (Eight) Hours As Needed for Moderate Pain ., Disp: 90 tablet, Rfl: 0  •  amoxicillin-clavulanate (AUGMENTIN) 875-125 MG per tablet, Take 1 tablet by mouth 2 (Two) Times a Day., Disp: 14 tablet, Rfl: 0  •  PredniSONE 5 MG tablet therapy pack dosepak, Take as directed on package instructions., Disp: 21 each, Rfl: 0    Objective   /65 (BP Location: Right arm, Patient Position: Sitting)  Pulse 71  Temp 97.9 °F (36.6 °C) (Oral)   Ht 68\" (172.7 cm)  Wt 243 lb 12.8 oz (111 kg)  SpO2 97%  BMI 37.07 kg/m2    Physical Exam   Constitutional: She is oriented to person, place, and time. She appears well-developed and well-nourished.   HENT:   Head: Normocephalic and atraumatic.   Boggy nasal mucosa.  Bilateral sinus fullness   Eyes: Conjunctivae are normal.   Pulmonary/Chest: Effort normal.   Musculoskeletal: Normal range of motion.   Neurological: She is alert and oriented to person, place, and time.   Psychiatric: She has a normal mood and affect. Her behavior is normal.   Nursing note and vitals " reviewed.      Assessment/Plan   Jessy was seen today for follow-up.    Diagnoses and all orders for this visit:    Yeast vaginitis  -     fluconazole (DIFLUCAN) 150 MG tablet; Take 1 tablet by mouth 1 (One) Time for 1 dose.    Encounter for smoking cessation counseling  -     buPROPion XL (WELLBUTRIN XL) 150 MG 24 hr tablet; Take 1 tablet by mouth Daily.    Bilateral low back pain without sciatica, unspecified chronicity  -     HYDROcodone-acetaminophen (NORCO)  MG per tablet; Take 1 tablet by mouth Every 8 (Eight) Hours As Needed for Moderate Pain .    Degeneration of intervertebral disc of lumbar region  -     HYDROcodone-acetaminophen (NORCO)  MG per tablet; Take 1 tablet by mouth Every 8 (Eight) Hours As Needed for Moderate Pain .    Bacterial sinusitis  -     amoxicillin-clavulanate (AUGMENTIN) 875-125 MG per tablet; Take 1 tablet by mouth 2 (Two) Times a Day.  -     PredniSONE 5 MG tablet therapy pack dosepak; Take as directed on package instructions.          Discussion Summary:    47-year-old white female presenting for follow-up.    1.  Acute bacterial sinusitis  -Start Augmentin, prednisone, and Diflucan.  Diflucan is for antibiotic associated yeast vaginitis.    2.  Chronic pain  - pain meds refilled  - Patient is aware he/she is being prescribed a high risk controlled substance. SUSANA FRIAS was reviewed and appropriate prior to providing prescriptions for controlled substances.        Follow up:  No Follow-up on file.     Patient Instructions:  Patient instructions were provided.

## 2017-08-18 ENCOUNTER — OFFICE VISIT (OUTPATIENT)
Dept: FAMILY MEDICINE CLINIC | Facility: CLINIC | Age: 47
End: 2017-08-18

## 2017-08-18 VITALS
HEIGHT: 68 IN | BODY MASS INDEX: 36.54 KG/M2 | WEIGHT: 241.12 LBS | OXYGEN SATURATION: 97 % | SYSTOLIC BLOOD PRESSURE: 146 MMHG | DIASTOLIC BLOOD PRESSURE: 78 MMHG | TEMPERATURE: 97.9 F | HEART RATE: 90 BPM

## 2017-08-18 DIAGNOSIS — M54.50 BILATERAL LOW BACK PAIN WITHOUT SCIATICA, UNSPECIFIED CHRONICITY: ICD-10-CM

## 2017-08-18 DIAGNOSIS — M51.36 DEGENERATION OF INTERVERTEBRAL DISC OF LUMBAR REGION: ICD-10-CM

## 2017-08-18 PROCEDURE — 99214 OFFICE O/P EST MOD 30 MIN: CPT | Performed by: INTERNAL MEDICINE

## 2017-08-18 RX ORDER — HYDROCODONE BITARTRATE AND ACETAMINOPHEN 10; 325 MG/1; MG/1
1 TABLET ORAL EVERY 8 HOURS PRN
Qty: 90 TABLET | Refills: 0 | Status: SHIPPED | OUTPATIENT
Start: 2017-08-18 | End: 2017-09-15 | Stop reason: SDUPTHER

## 2017-08-18 NOTE — PROGRESS NOTES
Chief Complaint   Patient presents with   • Follow-up     Patient is here for medicine refill       Subjective     History of Present Illness   Jessy Uribe is a 47 y.o. female presenting for chronic pain.  Patient is requesting refills.  Pain symptoms have been well controlled with the current regimen.      The following portions of the patient's history were reviewed and updated as appropriate: allergies, current medications, past family history, past medical history, past social history, past surgical history and problem list.    Review of Systems   Constitutional: Negative for chills, fatigue and fever.   HENT: Negative for congestion, ear pain, rhinorrhea, sinus pressure and sore throat.    Eyes: Negative for visual disturbance.   Respiratory: Negative for cough, chest tightness, shortness of breath and wheezing.    Cardiovascular: Negative for chest pain, palpitations and leg swelling.   Gastrointestinal: Negative for abdominal pain, blood in stool, constipation, diarrhea, nausea and vomiting.   Endocrine: Negative for polydipsia and polyuria.   Genitourinary: Negative for dysuria and hematuria.   Musculoskeletal: Positive for arthralgias and back pain.   Skin: Negative for rash.   Neurological: Negative for dizziness, light-headedness, numbness and headaches.   Psychiatric/Behavioral: Negative for dysphoric mood and sleep disturbance. The patient is not nervous/anxious.        No Known Allergies    Past Medical History:   Diagnosis Date   • Acute bronchitis with bronchospasm    • BMI 35.0-35.9,adult    • BMI 36.0-36.9,adult    • BMI 37.0-37.9, adult    • Fracture of lower extremity    • Gallstones    • Obesity 6/24/2016       Social History     Social History   • Marital status: Single     Spouse name: N/A   • Number of children: N/A   • Years of education: N/A     Occupational History   • Not on file.     Social History Main Topics   • Smoking status: Current Every Day Smoker   • Smokeless tobacco: Not  "on file   • Alcohol use No      Comment: unknown   • Drug use: No   • Sexual activity: Defer     Other Topics Concern   • Not on file     Social History Narrative        Past Surgical History:   Procedure Laterality Date   • ANTERIOR COMPARTMENT DECOMPRESSION      Leg Decompression Fasciotomy Ant Compartment, Debridement   • BACK SURGERY     •  SECTION     • CHOLECYSTECTOMY     • LAPAROSCOPY REPAIR HIATAL HERNIA         Family History   Problem Relation Age of Onset   • Diabetes Mother    • Other Father      Cerebrovascular accident   • Cancer Sister      pancreatic         Current Outpatient Prescriptions:   •  HYDROcodone-acetaminophen (NORCO)  MG per tablet, Take 1 tablet by mouth Every 8 (Eight) Hours As Needed for Moderate Pain ., Disp: 90 tablet, Rfl: 0    Objective   /78 (BP Location: Left arm, Patient Position: Sitting)  Pulse 90  Temp 97.9 °F (36.6 °C) (Oral)   Ht 68\" (172.7 cm)  Wt 241 lb 1.9 oz (109 kg)  SpO2 97%  BMI 36.66 kg/m2    Physical Exam   Constitutional: She is oriented to person, place, and time. She appears well-developed and well-nourished.   HENT:   Head: Normocephalic and atraumatic.   Eyes: Conjunctivae are normal.   Pulmonary/Chest: Effort normal.   Musculoskeletal: Normal range of motion.   Neurological: She is alert and oriented to person, place, and time.   Psychiatric: She has a normal mood and affect. Her behavior is normal.   Nursing note and vitals reviewed.      Assessment/Plan   Jessy was seen today for follow-up.    Diagnoses and all orders for this visit:    Bilateral low back pain without sciatica, unspecified chronicity  -     HYDROcodone-acetaminophen (NORCO)  MG per tablet; Take 1 tablet by mouth Every 8 (Eight) Hours As Needed for Moderate Pain .    Degeneration of intervertebral disc of lumbar region  -     HYDROcodone-acetaminophen (NORCO)  MG per tablet; Take 1 tablet by mouth Every 8 (Eight) Hours As Needed for Moderate Pain " .          Discussion Summary:    1. Chronic pain   - medications were refilled as patient continues to benefit from the medications.   -  prior UDS results reviewed and appropriate  - Patient is aware he/she is being prescribed a high risk controlled substance. A ALTAGRACIA was reviewed and appropriate prior to providing prescriptions for controlled substances.        Follow up:  No Follow-up on file.     Patient Instructions:  Patient instructions were provided.

## 2017-09-15 ENCOUNTER — OFFICE VISIT (OUTPATIENT)
Dept: FAMILY MEDICINE CLINIC | Facility: CLINIC | Age: 47
End: 2017-09-15

## 2017-09-15 VITALS
WEIGHT: 245.8 LBS | BODY MASS INDEX: 37.25 KG/M2 | DIASTOLIC BLOOD PRESSURE: 52 MMHG | TEMPERATURE: 97.9 F | HEIGHT: 68 IN | OXYGEN SATURATION: 100 % | SYSTOLIC BLOOD PRESSURE: 122 MMHG | HEART RATE: 59 BPM

## 2017-09-15 DIAGNOSIS — M54.50 BILATERAL LOW BACK PAIN WITHOUT SCIATICA, UNSPECIFIED CHRONICITY: ICD-10-CM

## 2017-09-15 DIAGNOSIS — M51.36 DEGENERATION OF INTERVERTEBRAL DISC OF LUMBAR REGION: ICD-10-CM

## 2017-09-15 DIAGNOSIS — Z23 NEED FOR INFLUENZA VACCINATION: Primary | ICD-10-CM

## 2017-09-15 PROCEDURE — 99214 OFFICE O/P EST MOD 30 MIN: CPT | Performed by: INTERNAL MEDICINE

## 2017-09-15 PROCEDURE — 90471 IMMUNIZATION ADMIN: CPT | Performed by: INTERNAL MEDICINE

## 2017-09-15 PROCEDURE — 90686 IIV4 VACC NO PRSV 0.5 ML IM: CPT | Performed by: INTERNAL MEDICINE

## 2017-09-15 RX ORDER — HYDROCODONE BITARTRATE AND ACETAMINOPHEN 10; 325 MG/1; MG/1
1 TABLET ORAL EVERY 8 HOURS PRN
Qty: 90 TABLET | Refills: 0 | Status: SHIPPED | OUTPATIENT
Start: 2017-09-15 | End: 2017-10-17 | Stop reason: SDUPTHER

## 2017-09-15 NOTE — PATIENT INSTRUCTIONS
Chronic Back Pain  When back pain lasts longer than 3 months, it is called chronic back pain. The cause of your back pain may not be known. Some common causes include:  · Wear and tear (degenerative disease) of the bones, ligaments, or disks in your back.  · Inflammation and stiffness in your back (arthritis).  People who have chronic back pain often go through certain periods in which the pain is more intense (flare-ups). Many people can learn to manage the pain with home care.  HOME CARE INSTRUCTIONS  Pay attention to any changes in your symptoms. Take these actions to help with your pain:  Activity  · Avoid bending and activities that make the problem worse.  · Do not sit or  one place for long periods of time.  · Take brief periods of rest throughout the day. This will reduce your pain. Resting in a lying or standing position is usually better than sitting to rest.  · When you are resting for longer periods, mix in some mild activity or stretching between periods of rest. This will help to prevent stiffness and pain.  · Get regular exercise. Ask your health care provider what activities are safe for you.  · Do not lift anything that is heavier than 10 lb (4.5 kg). Always use proper lifting technique, which includes:    Bending your knees.    Keeping the load close to your body.    Avoiding twisting.  Managing Pain  · If directed, apply ice to the painful area. Your health care provider may recommend applying ice during the first 24-48 hours after a flare-up begins.    Put ice in a plastic bag.    Place a towel between your skin and the bag.    Leave the ice on for 20 minutes, 2-3 times per day.  · After icing, apply heat to the affected area as often as told by your health care provider. Use the heat source that your health care provider recommends, such as a moist heat pack or a heating pad.    Place a towel between your skin and the heat source.    Leave the heat on for 20-30 minutes.    Remove the  heat if your skin turns bright red. This is especially important if you are unable to feel pain, heat, or cold. You may have a greater risk of getting burned.  · Try soaking in a warm tub.  · Take over-the-counter and prescription medicines only as told by your health care provider.  · Keep all follow-up visits as told by your health care provider. This is important.  SEEK MEDICAL CARE IF:  · You have pain that is not relieved with rest or medicine.  SEEK IMMEDIATE MEDICAL CARE IF:  · You have weakness or numbness in one or both of your legs or feet.  · You have trouble controlling your bladder or your bowels.  · You have nausea or vomiting.  · You have pain in your abdomen.  · You have shortness of breath or you faint.     This information is not intended to replace advice given to you by your health care provider. Make sure you discuss any questions you have with your health care provider.     Document Released: 01/25/2006 Document Revised: 04/10/2017 Document Reviewed: 06/06/2016  [employer] Patient Education ©2017 [employer]

## 2017-09-15 NOTE — PROGRESS NOTES
Chief Complaint   Patient presents with   • Follow-up     Patient is here for medicine refill       Subjective     History of Present Illness   Jessy Uribe is a 47 y.o. female presenting for chronic pain.  Patient is requesting refills.  Pain symptoms have been well controlled with the current regimen.  Still smokes about 1/2ppd. Fortunately keeping busy at work has allowed her to smoke less.     The following portions of the patient's history were reviewed and updated as appropriate: allergies, current medications, past family history, past medical history, past social history, past surgical history and problem list.    Review of Systems   Constitutional: Negative for chills, fatigue and fever.   HENT: Negative for congestion, ear pain, rhinorrhea, sinus pressure and sore throat.    Eyes: Negative for visual disturbance.   Respiratory: Negative for cough, chest tightness, shortness of breath and wheezing.    Cardiovascular: Negative for chest pain, palpitations and leg swelling.   Gastrointestinal: Negative for abdominal pain, blood in stool, constipation, diarrhea, nausea and vomiting.   Endocrine: Negative for polydipsia and polyuria.   Genitourinary: Negative for dysuria and hematuria.   Musculoskeletal: Negative for back pain.   Skin: Negative for rash.   Neurological: Negative for dizziness, light-headedness, numbness and headaches.   Psychiatric/Behavioral: Negative for dysphoric mood and sleep disturbance. The patient is not nervous/anxious.        No Known Allergies    Past Medical History:   Diagnosis Date   • Acute bronchitis with bronchospasm    • BMI 35.0-35.9,adult    • BMI 36.0-36.9,adult    • BMI 37.0-37.9, adult    • Fracture of lower extremity    • Gallstones    • Obesity 6/24/2016       Social History     Social History   • Marital status: Single     Spouse name: N/A   • Number of children: N/A   • Years of education: N/A     Occupational History   • Not on file.     Social History Main  "Topics   • Smoking status: Current Every Day Smoker   • Smokeless tobacco: Not on file   • Alcohol use No      Comment: unknown   • Drug use: No   • Sexual activity: Defer     Other Topics Concern   • Not on file     Social History Narrative        Past Surgical History:   Procedure Laterality Date   • ANTERIOR COMPARTMENT DECOMPRESSION      Leg Decompression Fasciotomy Ant Compartment, Debridement   • BACK SURGERY     •  SECTION     • CHOLECYSTECTOMY     • LAPAROSCOPY REPAIR HIATAL HERNIA         Family History   Problem Relation Age of Onset   • Diabetes Mother    • Other Father      Cerebrovascular accident   • Cancer Sister      pancreatic         Current Outpatient Prescriptions:   •  HYDROcodone-acetaminophen (NORCO)  MG per tablet, Take 1 tablet by mouth Every 8 (Eight) Hours As Needed for Moderate Pain ., Disp: 90 tablet, Rfl: 0    Objective   /52 (BP Location: Left arm, Patient Position: Sitting)  Pulse 59  Temp 97.9 °F (36.6 °C) (Oral)   Ht 68\" (172.7 cm)  Wt 245 lb 12.8 oz (111 kg)  SpO2 100%  BMI 37.37 kg/m2    Physical Exam   Constitutional: She is oriented to person, place, and time. She appears well-developed and well-nourished.   HENT:   Head: Normocephalic and atraumatic.   Eyes: Conjunctivae are normal.   Cardiovascular: Normal rate, regular rhythm and normal heart sounds.    Pulmonary/Chest: Effort normal.   Musculoskeletal: Normal range of motion.   Neurological: She is alert and oriented to person, place, and time.   Psychiatric: She has a normal mood and affect. Her behavior is normal.   Nursing note and vitals reviewed.      Assessment/Plan   Jessy was seen today for follow-up.    Diagnoses and all orders for this visit:    Need for influenza vaccination  -     Flu Vaccine Quad PF >18YR (AFLURIA 7441-7927)    Bilateral low back pain without sciatica, unspecified chronicity  -     HYDROcodone-acetaminophen (NORCO)  MG per tablet; Take 1 tablet by mouth Every 8 " (Eight) Hours As Needed for Moderate Pain .    Degeneration of intervertebral disc of lumbar region  -     HYDROcodone-acetaminophen (NORCO)  MG per tablet; Take 1 tablet by mouth Every 8 (Eight) Hours As Needed for Moderate Pain .          Discussion Summary:    1. Chronic pain   - medications were refilled as patient continues to benefit from the medications.   - UDS performed, prior UDS results reviewed and appropriate  - Patient is aware he/she is being prescribed a high risk controlled substance. A ALTAGRACIA was reviewed and appropriate prior to providing prescriptions for controlled substances.    2. Tobacco abuse  - will try wellbutrin, she has refills in pharmacy        Follow up:  No Follow-up on file.     Patient Instructions:  Patient instructions were provided.

## 2017-10-17 ENCOUNTER — OFFICE VISIT (OUTPATIENT)
Dept: FAMILY MEDICINE CLINIC | Facility: CLINIC | Age: 47
End: 2017-10-17

## 2017-10-17 VITALS
HEART RATE: 74 BPM | SYSTOLIC BLOOD PRESSURE: 125 MMHG | DIASTOLIC BLOOD PRESSURE: 72 MMHG | OXYGEN SATURATION: 99 % | BODY MASS INDEX: 36.95 KG/M2 | RESPIRATION RATE: 16 BRPM | TEMPERATURE: 98.2 F | WEIGHT: 243 LBS

## 2017-10-17 DIAGNOSIS — M54.50 BILATERAL LOW BACK PAIN WITHOUT SCIATICA, UNSPECIFIED CHRONICITY: ICD-10-CM

## 2017-10-17 DIAGNOSIS — K21.9 GASTROESOPHAGEAL REFLUX DISEASE, ESOPHAGITIS PRESENCE NOT SPECIFIED: Primary | ICD-10-CM

## 2017-10-17 DIAGNOSIS — M51.36 DEGENERATION OF INTERVERTEBRAL DISC OF LUMBAR REGION: ICD-10-CM

## 2017-10-17 PROCEDURE — 99214 OFFICE O/P EST MOD 30 MIN: CPT | Performed by: INTERNAL MEDICINE

## 2017-10-17 RX ORDER — HYDROCODONE BITARTRATE AND ACETAMINOPHEN 10; 325 MG/1; MG/1
1 TABLET ORAL EVERY 8 HOURS PRN
Qty: 90 TABLET | Refills: 0 | Status: SHIPPED | OUTPATIENT
Start: 2017-10-17 | End: 2017-11-15 | Stop reason: SDUPTHER

## 2017-10-17 RX ORDER — RANITIDINE 150 MG/1
150 TABLET ORAL 2 TIMES DAILY
Qty: 60 TABLET | Refills: 2 | Status: SHIPPED | OUTPATIENT
Start: 2017-10-17

## 2017-10-17 NOTE — PROGRESS NOTES
Chief Complaint   Patient presents with   • Back Pain       Subjective     History of Present Illness   Jessy Uribe is a 47 y.o. female.     The following portions of the patient's history were reviewed and updated as appropriate: allergies, current medications, past family history, past medical history, past social history, past surgical history and problem list.    Review of Systems   Constitutional: Negative for chills, fatigue and fever.   HENT: Negative for congestion, ear pain, rhinorrhea, sinus pressure and sore throat.    Eyes: Negative for visual disturbance.   Respiratory: Negative for cough, chest tightness, shortness of breath and wheezing.    Cardiovascular: Negative for chest pain, palpitations and leg swelling.   Gastrointestinal: Negative for abdominal pain, blood in stool, constipation, diarrhea, nausea and vomiting.   Endocrine: Negative for polydipsia and polyuria.   Genitourinary: Negative for dysuria and hematuria.   Musculoskeletal: Negative for back pain.   Skin: Negative for rash.   Neurological: Negative for dizziness, light-headedness, numbness and headaches.   Psychiatric/Behavioral: Negative for dysphoric mood and sleep disturbance. The patient is not nervous/anxious.        No Known Allergies    Past Medical History:   Diagnosis Date   • Acute bronchitis with bronchospasm    • BMI 35.0-35.9,adult    • BMI 36.0-36.9,adult    • BMI 37.0-37.9, adult    • Fracture of lower extremity    • Gallstones    • Obesity 6/24/2016       Social History     Social History   • Marital status: Single     Spouse name: N/A   • Number of children: N/A   • Years of education: N/A     Occupational History   • Not on file.     Social History Main Topics   • Smoking status: Current Every Day Smoker   • Smokeless tobacco: Not on file   • Alcohol use No      Comment: unknown   • Drug use: No   • Sexual activity: Defer     Other Topics Concern   • Not on file     Social History Narrative        Past Surgical  History:   Procedure Laterality Date   • ANTERIOR COMPARTMENT DECOMPRESSION      Leg Decompression Fasciotomy Ant Compartment, Debridement   • BACK SURGERY     •  SECTION     • CHOLECYSTECTOMY     • LAPAROSCOPY REPAIR HIATAL HERNIA         Family History   Problem Relation Age of Onset   • Diabetes Mother    • Other Father      Cerebrovascular accident   • Cancer Sister      pancreatic         Current Outpatient Prescriptions:   •  HYDROcodone-acetaminophen (NORCO)  MG per tablet, Take 1 tablet by mouth Every 8 (Eight) Hours As Needed for Moderate Pain ., Disp: 90 tablet, Rfl: 0    Objective   /72  Pulse 74  Temp 98.2 °F (36.8 °C) (Oral)   Resp 16  Wt 243 lb (110 kg)  SpO2 99%  BMI 36.95 kg/m2    Physical Exam   Constitutional: She is oriented to person, place, and time. She appears well-developed and well-nourished.   HENT:   Head: Normocephalic and atraumatic.   Eyes: Conjunctivae are normal.   Pulmonary/Chest: Effort normal.   Musculoskeletal: Normal range of motion.   Neurological: She is alert and oriented to person, place, and time.   Psychiatric: She has a normal mood and affect. Her behavior is normal.   Nursing note and vitals reviewed.      Assessment/Plan   There are no diagnoses linked to this encounter.      Discussion Summary:        Follow up:  No Follow-up on file.     Patient Instructions:  {MS patient instructions:67674}

## 2017-10-18 NOTE — PROGRESS NOTES
Chief Complaint   Patient presents with   • Back Pain       Subjective     History of Present Illness   Jessy Uribe is a 47 y.o. female presenting for chronic pain.  Patient is requesting refills.  Pain symptoms have been well controlled with the current regimen.  Patient also complains of GERD symptoms and requests to start on medications to control symptoms.  Symptoms occur throughout the day, pt unable to specify which foods trigger symptoms.      The following portions of the patient's history were reviewed and updated as appropriate: allergies, current medications, past family history, past medical history, past social history, past surgical history and problem list.    Review of Systems   Constitutional: Negative for chills, fatigue and fever.   HENT: Negative for congestion, ear pain, rhinorrhea, sinus pressure and sore throat.    Eyes: Negative for visual disturbance.   Respiratory: Negative for cough, chest tightness, shortness of breath and wheezing.    Cardiovascular: Negative for chest pain, palpitations and leg swelling.   Gastrointestinal: Negative for abdominal pain, blood in stool, constipation, diarrhea, nausea and vomiting.   Endocrine: Negative for polydipsia and polyuria.   Genitourinary: Negative for dysuria and hematuria.   Musculoskeletal: Negative for back pain.   Skin: Negative for rash.   Neurological: Negative for dizziness, light-headedness, numbness and headaches.   Psychiatric/Behavioral: Negative for dysphoric mood and sleep disturbance. The patient is not nervous/anxious.        No Known Allergies    Past Medical History:   Diagnosis Date   • Acute bronchitis with bronchospasm    • BMI 35.0-35.9,adult    • BMI 36.0-36.9,adult    • BMI 37.0-37.9, adult    • Fracture of lower extremity    • Gallstones    • Obesity 6/24/2016       Social History     Social History   • Marital status: Single     Spouse name: N/A   • Number of children: N/A   • Years of education: N/A      Occupational History   • Not on file.     Social History Main Topics   • Smoking status: Current Every Day Smoker   • Smokeless tobacco: Not on file   • Alcohol use No      Comment: unknown   • Drug use: No   • Sexual activity: Defer     Other Topics Concern   • Not on file     Social History Narrative        Past Surgical History:   Procedure Laterality Date   • ANTERIOR COMPARTMENT DECOMPRESSION      Leg Decompression Fasciotomy Ant Compartment, Debridement   • BACK SURGERY     •  SECTION     • CHOLECYSTECTOMY     • LAPAROSCOPY REPAIR HIATAL HERNIA         Family History   Problem Relation Age of Onset   • Diabetes Mother    • Other Father      Cerebrovascular accident   • Cancer Sister      pancreatic         Current Outpatient Prescriptions:   •  HYDROcodone-acetaminophen (NORCO)  MG per tablet, Take 1 tablet by mouth Every 8 (Eight) Hours As Needed for Moderate Pain ., Disp: 90 tablet, Rfl: 0  •  raNITIdine (ZANTAC) 150 MG tablet, Take 1 tablet by mouth 2 (Two) Times a Day., Disp: 60 tablet, Rfl: 2    Objective   /72  Pulse 74  Temp 98.2 °F (36.8 °C) (Oral)   Resp 16  Wt 243 lb (110 kg)  SpO2 99%  BMI 36.95 kg/m2    Physical Exam   Constitutional: She is oriented to person, place, and time. She appears well-developed and well-nourished.   HENT:   Head: Normocephalic and atraumatic.   Eyes: Conjunctivae are normal.   Pulmonary/Chest: Effort normal.   Musculoskeletal: Normal range of motion.   Neurological: She is alert and oriented to person, place, and time.   Psychiatric: She has a normal mood and affect. Her behavior is normal.   Nursing note and vitals reviewed.      Assessment/Plan   Jessy was seen today for back pain.    Diagnoses and all orders for this visit:    Gastroesophageal reflux disease, esophagitis presence not specified  -     raNITIdine (ZANTAC) 150 MG tablet; Take 1 tablet by mouth 2 (Two) Times a Day.    Bilateral low back pain without sciatica, unspecified  chronicity  -     HYDROcodone-acetaminophen (NORCO)  MG per tablet; Take 1 tablet by mouth Every 8 (Eight) Hours As Needed for Moderate Pain .    Degeneration of intervertebral disc of lumbar region  -     HYDROcodone-acetaminophen (NORCO)  MG per tablet; Take 1 tablet by mouth Every 8 (Eight) Hours As Needed for Moderate Pain .          Discussion Summary:    1. Chronic pain   - medications were refilled as patient continues to benefit from the medications.   - UDS performed, prior UDS results reviewed and appropriate  - Patient is aware he/she is being prescribed a high risk controlled substance. A ALTAGRACIA was reviewed and appropriate prior to providing prescriptions for controlled substances.    2. GERD - start zantac.    Follow up:  Return in about 1 month (around 11/17/2017) for Next scheduled follow up, Med Refills.     Patient Instructions:  Patient instructions were provided.

## 2017-11-15 ENCOUNTER — OFFICE VISIT (OUTPATIENT)
Dept: FAMILY MEDICINE CLINIC | Facility: CLINIC | Age: 47
End: 2017-11-15

## 2017-11-15 VITALS
RESPIRATION RATE: 16 BRPM | WEIGHT: 245.8 LBS | SYSTOLIC BLOOD PRESSURE: 158 MMHG | BODY MASS INDEX: 37.25 KG/M2 | DIASTOLIC BLOOD PRESSURE: 73 MMHG | TEMPERATURE: 96.6 F | HEIGHT: 68 IN | HEART RATE: 70 BPM | OXYGEN SATURATION: 95 %

## 2017-11-15 DIAGNOSIS — M51.36 DEGENERATION OF INTERVERTEBRAL DISC OF LUMBAR REGION: ICD-10-CM

## 2017-11-15 DIAGNOSIS — M54.50 BILATERAL LOW BACK PAIN WITHOUT SCIATICA, UNSPECIFIED CHRONICITY: ICD-10-CM

## 2017-11-15 PROCEDURE — 99214 OFFICE O/P EST MOD 30 MIN: CPT | Performed by: INTERNAL MEDICINE

## 2017-11-15 RX ORDER — HYDROCODONE BITARTRATE AND ACETAMINOPHEN 10; 325 MG/1; MG/1
1 TABLET ORAL EVERY 8 HOURS PRN
Qty: 90 TABLET | Refills: 0 | Status: SHIPPED | OUTPATIENT
Start: 2017-11-15 | End: 2017-12-15 | Stop reason: SDUPTHER

## 2017-11-17 NOTE — PROGRESS NOTES
Chief Complaint   Patient presents with   • Follow-up   • Med Refill       Subjective     History of Present Illness   Jessy Uribe is a 47 y.o. female presenting for chronic pain.  Patient is requesting refills.  Pain symptoms have been well controlled with the current regimen.      The following portions of the patient's history were reviewed and updated as appropriate: allergies, current medications, past family history, past medical history, past social history, past surgical history and problem list.    Review of Systems   Constitutional: Negative for chills, fatigue and fever.   HENT: Negative for congestion, sinus pressure and sore throat.    Respiratory: Negative for chest tightness and shortness of breath.    Cardiovascular: Negative for chest pain and palpitations.   Gastrointestinal: Negative for abdominal distention, anal bleeding and constipation.   Endocrine: Negative for cold intolerance and polyphagia.   Genitourinary: Negative for difficulty urinating, dysuria and flank pain.   Musculoskeletal: Positive for back pain and gait problem. Negative for arthralgias and myalgias.   Neurological: Negative for dizziness, tremors, weakness and headaches.   Psychiatric/Behavioral: Negative for agitation and decreased concentration. The patient is not nervous/anxious.        No Known Allergies    Past Medical History:   Diagnosis Date   • Acute bronchitis with bronchospasm    • BMI 35.0-35.9,adult    • BMI 36.0-36.9,adult    • BMI 37.0-37.9, adult    • Fracture of lower extremity    • Gallstones    • Obesity 6/24/2016       Social History     Social History   • Marital status: Single     Spouse name: N/A   • Number of children: N/A   • Years of education: N/A     Occupational History   • Not on file.     Social History Main Topics   • Smoking status: Current Every Day Smoker   • Smokeless tobacco: Not on file   • Alcohol use No      Comment: unknown   • Drug use: No   • Sexual activity: Defer     Other  "Topics Concern   • Not on file     Social History Narrative        Past Surgical History:   Procedure Laterality Date   • ANTERIOR COMPARTMENT DECOMPRESSION      Leg Decompression Fasciotomy Ant Compartment, Debridement   • BACK SURGERY     •  SECTION     • CHOLECYSTECTOMY     • LAPAROSCOPY REPAIR HIATAL HERNIA         Family History   Problem Relation Age of Onset   • Diabetes Mother    • Other Father      Cerebrovascular accident   • Cancer Sister      pancreatic         Current Outpatient Prescriptions:   •  HYDROcodone-acetaminophen (NORCO)  MG per tablet, Take 1 tablet by mouth Every 8 (Eight) Hours As Needed for Moderate Pain ., Disp: 90 tablet, Rfl: 0  •  raNITIdine (ZANTAC) 150 MG tablet, Take 1 tablet by mouth 2 (Two) Times a Day., Disp: 60 tablet, Rfl: 2    Objective   /73 (BP Location: Right arm, Patient Position: Sitting, Cuff Size: Adult)  Pulse 70  Temp 96.6 °F (35.9 °C) (Oral)   Resp 16  Ht 68\" (172.7 cm)  Wt 245 lb 12.8 oz (111 kg)  SpO2 95%  BMI 37.37 kg/m2    Physical Exam   Constitutional: She is oriented to person, place, and time. She appears well-developed and well-nourished.   HENT:   Head: Normocephalic and atraumatic.   Eyes: Conjunctivae are normal.   Pulmonary/Chest: Effort normal.   Musculoskeletal: Normal range of motion.   Neurological: She is alert and oriented to person, place, and time.   Psychiatric: She has a normal mood and affect. Her behavior is normal.   Nursing note and vitals reviewed.      Assessment/Plan   Jessy was seen today for follow-up and med refill.    Diagnoses and all orders for this visit:    Bilateral low back pain without sciatica, unspecified chronicity  -     HYDROcodone-acetaminophen (NORCO)  MG per tablet; Take 1 tablet by mouth Every 8 (Eight) Hours As Needed for Moderate Pain .  -     Ambulatory Referral to Pain Management    Degeneration of intervertebral disc of lumbar region  -     HYDROcodone-acetaminophen (NORCO) "  MG per tablet; Take 1 tablet by mouth Every 8 (Eight) Hours As Needed for Moderate Pain .  -     Ambulatory Referral to Pain Management          Discussion Summary:    1. Chronic pain   - medications were refilled as patient continues to benefit from the medications.   -  prior UDS results reviewed and appropriate  - Patient is aware he/she is being prescribed a high risk controlled substance. A ALTAGRACIA was reviewed and appropriate prior to providing prescriptions for controlled substances.  - pain management referral placed, pt aware that she needs to obtain pain management by end of year.     Follow up:  No Follow-up on file.     Patient Instructions:  Patient instructions were provided.

## 2017-12-15 ENCOUNTER — OFFICE VISIT (OUTPATIENT)
Dept: FAMILY MEDICINE CLINIC | Facility: CLINIC | Age: 47
End: 2017-12-15

## 2017-12-15 VITALS
BODY MASS INDEX: 37.4 KG/M2 | WEIGHT: 246 LBS | HEART RATE: 70 BPM | SYSTOLIC BLOOD PRESSURE: 144 MMHG | RESPIRATION RATE: 16 BRPM | OXYGEN SATURATION: 100 % | TEMPERATURE: 97.7 F | DIASTOLIC BLOOD PRESSURE: 55 MMHG

## 2017-12-15 DIAGNOSIS — M51.36 DEGENERATION OF INTERVERTEBRAL DISC OF LUMBAR REGION: ICD-10-CM

## 2017-12-15 DIAGNOSIS — M54.50 BILATERAL LOW BACK PAIN WITHOUT SCIATICA, UNSPECIFIED CHRONICITY: ICD-10-CM

## 2017-12-15 PROCEDURE — 99214 OFFICE O/P EST MOD 30 MIN: CPT | Performed by: INTERNAL MEDICINE

## 2017-12-15 RX ORDER — HYDROCODONE BITARTRATE AND ACETAMINOPHEN 10; 325 MG/1; MG/1
1 TABLET ORAL EVERY 8 HOURS PRN
Qty: 90 TABLET | Refills: 0 | Status: SHIPPED | OUTPATIENT
Start: 2017-12-15

## 2017-12-18 NOTE — PROGRESS NOTES
Chief Complaint   Patient presents with   • Follow-up   • Med Refill       Subjective     History of Present Illness   Jessy Uribe is a 47 y.o. female presenting for chronic pain.  Patient is requesting refills.  Pain symptoms have been well controlled with the current regimen.  She has completed paperwork for pain management.     The following portions of the patient's history were reviewed and updated as appropriate: allergies, current medications, past family history, past medical history, past social history, past surgical history and problem list.    Review of Systems   Constitutional: Negative for chills, fatigue and fever.   HENT: Negative for congestion, ear pain, rhinorrhea, sinus pressure and sore throat.    Eyes: Negative for visual disturbance.   Respiratory: Negative for cough, chest tightness, shortness of breath and wheezing.    Cardiovascular: Negative for chest pain, palpitations and leg swelling.   Gastrointestinal: Negative for abdominal pain, blood in stool, constipation, diarrhea, nausea and vomiting.   Endocrine: Negative for polydipsia and polyuria.   Genitourinary: Negative for dysuria and hematuria.   Musculoskeletal: Positive for back pain.   Skin: Negative for rash.   Neurological: Negative for dizziness, light-headedness, numbness and headaches.   Psychiatric/Behavioral: Negative for dysphoric mood and sleep disturbance. The patient is not nervous/anxious.        No Known Allergies    Past Medical History:   Diagnosis Date   • Acute bronchitis with bronchospasm    • BMI 35.0-35.9,adult    • BMI 36.0-36.9,adult    • BMI 37.0-37.9, adult    • Fracture of lower extremity    • Gallstones    • Obesity 6/24/2016       Social History     Social History   • Marital status: Single     Spouse name: N/A   • Number of children: N/A   • Years of education: N/A     Occupational History   • Not on file.     Social History Main Topics   • Smoking status: Current Every Day Smoker   • Smokeless  tobacco: Not on file   • Alcohol use No      Comment: unknown   • Drug use: No   • Sexual activity: Defer     Other Topics Concern   • Not on file     Social History Narrative        Past Surgical History:   Procedure Laterality Date   • ANTERIOR COMPARTMENT DECOMPRESSION      Leg Decompression Fasciotomy Ant Compartment, Debridement   • BACK SURGERY     •  SECTION     • CHOLECYSTECTOMY     • LAPAROSCOPY REPAIR HIATAL HERNIA         Family History   Problem Relation Age of Onset   • Diabetes Mother    • Other Father      Cerebrovascular accident   • Cancer Sister      pancreatic         Current Outpatient Prescriptions:   •  HYDROcodone-acetaminophen (NORCO)  MG per tablet, Take 1 tablet by mouth Every 8 (Eight) Hours As Needed for Moderate Pain ., Disp: 90 tablet, Rfl: 0  •  raNITIdine (ZANTAC) 150 MG tablet, Take 1 tablet by mouth 2 (Two) Times a Day., Disp: 60 tablet, Rfl: 2    Objective   /55  Pulse 70  Temp 97.7 °F (36.5 °C) (Oral)   Resp 16  Wt 112 kg (246 lb)  SpO2 100%  BMI 37.4 kg/m2    Physical Exam   Constitutional: She is oriented to person, place, and time. She appears well-developed and well-nourished.   HENT:   Head: Normocephalic and atraumatic.   Eyes: Conjunctivae are normal.   Pulmonary/Chest: Effort normal.   Musculoskeletal: Normal range of motion.   Neurological: She is alert and oriented to person, place, and time.   Psychiatric: She has a normal mood and affect. Her behavior is normal.   Nursing note and vitals reviewed.      Assessment/Plan   Jessy was seen today for follow-up and med refill.    Diagnoses and all orders for this visit:    Bilateral low back pain without sciatica, unspecified chronicity  -     HYDROcodone-acetaminophen (NORCO)  MG per tablet; Take 1 tablet by mouth Every 8 (Eight) Hours As Needed for Moderate Pain .    Degeneration of intervertebral disc of lumbar region  -     HYDROcodone-acetaminophen (NORCO)  MG per tablet; Take 1  tablet by mouth Every 8 (Eight) Hours As Needed for Moderate Pain .          Discussion Summary:    1. Chronic pain - low back, DJD  - medications were refilled as patient continues to benefit from the medications.   -  prior UDS results reviewed and appropriate  - Patient is aware he/she is being prescribed a high risk controlled substance. A ALTAGRACIA was reviewed and appropriate prior to providing prescriptions for controlled substances.  - Pain management to continue pain medication refills from this point on.       Follow up:  No Follow-up on file.     Patient Instructions:  Patient instructions were provided.